# Patient Record
Sex: MALE | Race: OTHER | Employment: OTHER | URBAN - METROPOLITAN AREA
[De-identification: names, ages, dates, MRNs, and addresses within clinical notes are randomized per-mention and may not be internally consistent; named-entity substitution may affect disease eponyms.]

---

## 2017-01-19 NOTE — PATIENT DISCUSSION
(H25.043) Posterior subcapsular polar age-related cataract, bilateral - Assesment : Examination revealed Posterior Subcapsular Polar Senile Cataract. Patient is symptomatic with visual function affected. - Plan : Monitor for changes. Advised patient to call our office with decreased vision or increased symptoms.

## 2017-01-19 NOTE — PATIENT DISCUSSION
(H25.013) Cortical age-related cataract, bilateral - Assesment : Examination revealed Cortical Senile Cataract. Patient is symptomatic with visual function affected. - Plan : Risks, Benefits and Alternatives were discussed with patient at length for Cataract Surgery. Visual symptoms are consistent with Cataract findings on examination and current refraction no longer provides satisfactory vision. Advised that OS is amblyopic and visual outcome after cataract surgery may be limited and recommend OS SX first. Discussed EROF lenses but since he should wear glasses for protection may not be much benefit. Discussed that patient should wear glasses for protection due to amblyopia and may just consider a limited focus lens. Minimal astigmatism and do not recommend Toric lens. Patient does not mind wearing glasses. Patient understands and desires surgery. All questions answered. Risks, Benefits and Alternatives discussed at length for IOL placement. Doctor suggests Limited focus lens. Patient desires Limited focus lens. Patient will need to wear glasses for near and best corrected distance vision. EYE:  OS  IOL TYPE: Limted focus lens  POST OPERATIVE TARGET: [place] PACKAGE:  None OD to follow.

## 2017-01-19 NOTE — PATIENT DISCUSSION
(H53.002) Unspecified amblyopia, left eye - Assesment : Examination revealed amblyopia Os from childhood. - Plan : Monitor for changes.

## 2017-01-19 NOTE — PATIENT DISCUSSION
(H35.372) Puckering of macula, left eye - Assesment : Examination revealed ERM OS. - Plan : Monitor. Advised patient to call our office with decreased vision or increased symptoms.

## 2017-03-15 NOTE — PATIENT DISCUSSION
(Z96.1) Presence of intraocular lens - Assesment : Patient is Pseudophakic OD. - Plan : Discussed signs and symptoms of infection and retinal detachments. Do not rub operated eye. Follow drop schedule If redness,pain,decreased vision, flashes or floaters occur then contact clinic.

## 2017-03-21 NOTE — PATIENT DISCUSSION
(Z96.1) Presence of intraocular lens - Assesment : Patient is Pseudophakic. - Plan : Discussed signs and symptoms of infection and retinal detachments. Do not rub operated eye. Follow drop schedule If redness,pain,decreased vision, flashes or floaters occur then contact clinic. MD recommends +2.50 OTCs for time being. Patient is leaving April 11th for [address]. RV for 1 Month PO before patient leaves.

## 2017-04-10 NOTE — PATIENT DISCUSSION
(Z96.1) Presence of intraocular lens - Assesment : Patient is Pseudophakic OD. - Plan : Signs and symptoms of infection and retinal detachment are outlined in your surgical packet. Do not rub operated eye. Follow drop schedule. If redness, pain, decreased vision, flashes or floaters occur then contact clinic. RV 6 months follow up/MAC OCT.

## 2017-10-27 NOTE — PATIENT DISCUSSION
(Q62.477) Keratoconjunct sicca, not specified as Sjogren's, bilateral - Assesment : Examination revealed Dry Eye Syndrome OU. - Plan : Recommend artificial tears 2-3 times daily OU/PRN. Monitor for changes. Advised patient to call our office with decreased vision or increased symptoms.  RV 1 year Exam.

## 2017-10-27 NOTE — PATIENT DISCUSSION
(H35.173) Drusen (degenerative) of macula, bilateral - Assesment : Examination revealed rare Drusen OU. - Plan : Monitor for changes. Advised patient to call our office with decreased vision or increased symptoms.

## 2018-11-19 NOTE — PATIENT DISCUSSION
(Y25.358) Vitreous degeneration, bilateral - Assesment : Examination revealed a posterior vitreous detachment. - Plan : Handouts given on posterior vitreous detachment and risk factors discussed for retinal detachment development. Advised to call immediately with any changes.

## 2018-11-19 NOTE — PATIENT DISCUSSION
(Z72.308) Keratoconjunct sicca, not specified as Sjogren's, bilateral - Assesment : Examination revealed Dry Eye Syndrome OU. - Plan : Recommend artificial tears 2-3 times daily OU/PRN. Monitor for changes. Advised patient to call our office with decreased vision or increased symptoms.   RTC 1 year/EXAM

## 2018-11-19 NOTE — PATIENT DISCUSSION
(Q84.346) Other secondary cataract, left eye - Assesment : Significant posterior capsule opacification present. Patient is currently asymptomatic and functioning well. - Plan : Monitor for Changes. Advised patient to call our office with decreased vision or increased symptoms.

## 2020-11-16 ENCOUNTER — IMPORTED ENCOUNTER (OUTPATIENT)
Dept: URBAN - METROPOLITAN AREA CLINIC 50 | Facility: CLINIC | Age: 65
End: 2020-11-16

## 2021-04-18 ASSESSMENT — VISUAL ACUITY
OD_BAT: 20/25
OS_SC: 20/40
OD_CC: J1+
OS_OTHER: 20/30. 20/30.
OD_SC: 20/30
OS_PH: 20/30
OS_CC: J1+
OD_OTHER: 20/25. 20/25.
OS_BAT: 20/30

## 2021-04-18 ASSESSMENT — TONOMETRY
OD_IOP_MMHG: 14
OS_IOP_MMHG: 15

## 2021-09-23 ENCOUNTER — PREPPED CHART (OUTPATIENT)
Dept: URBAN - METROPOLITAN AREA CLINIC 53 | Facility: CLINIC | Age: 66
End: 2021-09-23

## 2021-12-23 ENCOUNTER — ESTABLISHED PATIENT (OUTPATIENT)
Dept: URBAN - METROPOLITAN AREA CLINIC 53 | Facility: CLINIC | Age: 66
End: 2021-12-23

## 2021-12-23 DIAGNOSIS — H25.13: ICD-10-CM

## 2021-12-23 DIAGNOSIS — H20.013: ICD-10-CM

## 2021-12-23 DIAGNOSIS — H04.123: ICD-10-CM

## 2021-12-23 PROCEDURE — 92134 CPTRZ OPH DX IMG PST SGM RTA: CPT

## 2021-12-23 PROCEDURE — 92014 COMPRE OPH EXAM EST PT 1/>: CPT

## 2021-12-23 ASSESSMENT — VISUAL ACUITY
OD_SC: 20/25
OU_SC: 20/20-1
OS_SC: 20/25

## 2021-12-23 ASSESSMENT — TONOMETRY
OD_IOP_MMHG: 14
OS_IOP_MMHG: 14

## 2021-12-23 NOTE — PATIENT DISCUSSION
Questionable Iritis, patient is to start Alrex TID OU until sample is gone. Lotemax has been Escribed to pharmacy to start after the Alrex sample is gone.

## 2022-01-06 ENCOUNTER — FOLLOW UP (OUTPATIENT)
Dept: URBAN - METROPOLITAN AREA CLINIC 53 | Facility: CLINIC | Age: 67
End: 2022-01-06

## 2022-01-06 DIAGNOSIS — H20.013: ICD-10-CM

## 2022-01-06 PROCEDURE — 92012 INTRM OPH EXAM EST PATIENT: CPT

## 2022-01-06 ASSESSMENT — VISUAL ACUITY
OD_SC: 20/25
OU_SC: 20/20
OS_SC: 20/25

## 2022-01-06 ASSESSMENT — TONOMETRY
OD_IOP_MMHG: 14
OS_IOP_MMHG: 14

## 2022-01-06 NOTE — PATIENT DISCUSSION
D/w patient for the next three days use drop TID. On days 4-6 use drop BID, then on day 7 use drop once a day. D/w patient if pain starts back up call office immediately.

## 2022-03-02 ENCOUNTER — COMPREHENSIVE EXAM (OUTPATIENT)
Dept: URBAN - METROPOLITAN AREA CLINIC 53 | Facility: CLINIC | Age: 67
End: 2022-03-02

## 2022-03-02 DIAGNOSIS — H20.013: ICD-10-CM

## 2022-03-02 DIAGNOSIS — H25.13: ICD-10-CM

## 2022-03-02 DIAGNOSIS — H52.4: ICD-10-CM

## 2022-03-02 DIAGNOSIS — E11.9: ICD-10-CM

## 2022-03-02 DIAGNOSIS — H16.223: ICD-10-CM

## 2022-03-02 PROCEDURE — 92015 DETERMINE REFRACTIVE STATE: CPT

## 2022-03-02 PROCEDURE — 92014 COMPRE OPH EXAM EST PT 1/>: CPT

## 2022-03-02 PROCEDURE — 92134 CPTRZ OPH DX IMG PST SGM RTA: CPT

## 2022-03-02 RX ORDER — PREDNISOLONE ACETATE 10 MG/ML: 1 SUSPENSION/ DROPS OPHTHALMIC

## 2022-03-02 ASSESSMENT — KERATOMETRY
OD_AXISANGLE_DEGREES: 077
OD_K2POWER_DIOPTERS: 43.50
OS_AXISANGLE_DEGREES: 131
OD_AXISANGLE2_DEGREES: 167
OS_K1POWER_DIOPTERS: 43.50
OS_K2POWER_DIOPTERS: 44.00
OD_K1POWER_DIOPTERS: 43.00
OS_AXISANGLE2_DEGREES: 41

## 2022-03-02 ASSESSMENT — TONOMETRY
OS_IOP_MMHG: 13
OD_IOP_MMHG: 13

## 2022-03-02 ASSESSMENT — VISUAL ACUITY
OD_PH: 20/20-2
OD_CC: J2
OS_CC: J1-1
OD_GLARE: 20/25-2
OU_CC: J1
OS_GLARE: 20/30
OS_SC: 20/25
OU_SC: 20/25
OS_GLARE: 20/40-2
OD_GLARE: 20/40+1
OD_SC: 20/30

## 2022-03-02 NOTE — PATIENT DISCUSSION
Went over drop regiment with patient today. Continue Preservative free artificial tears 2-3 times a day OU. Start warm compresses and lid scrubs OU.

## 2022-03-02 NOTE — PATIENT DISCUSSION
Denies history of autoimmune/systemic conditions. Patient states ocular symptoms have been present since COVID vaccine. If reoccurence, recommend labs to rule out underlying systemic condition.

## 2022-03-02 NOTE — PATIENT DISCUSSION
Iritis evident today OU. Informed patient to d/c Lotemax. Start Prednisolone Acetate OU tid for 2 weeks, taper instructions to be given at next appointment. RTC for follow up in 2 weeks. D/w patient if pain starts back up call office immediately.

## 2022-03-16 ENCOUNTER — FOLLOW UP (OUTPATIENT)
Dept: URBAN - METROPOLITAN AREA CLINIC 53 | Facility: CLINIC | Age: 67
End: 2022-03-16

## 2022-03-16 DIAGNOSIS — H20.013: ICD-10-CM

## 2022-03-16 PROCEDURE — 92012 INTRM OPH EXAM EST PATIENT: CPT

## 2022-03-16 ASSESSMENT — VISUAL ACUITY
OS_PH: 20/25-1
OS_SC: 20/30-1
OU_SC: 20/25
OD_PH: 20/20
OD_SC: 20/30-1

## 2022-03-16 ASSESSMENT — TONOMETRY
OS_IOP_MMHG: 15
OD_IOP_MMHG: 15

## 2022-03-16 NOTE — PATIENT DISCUSSION
Improved. Start taper, use Prednisolone Acetate OU twice a day for 2 weeks then 1 time a day for 2 weeks.  RTC for follow up in 3-4 weeks. D/w patient if pain starts back up call office immediately.

## 2022-04-11 ENCOUNTER — FOLLOW UP (OUTPATIENT)
Dept: URBAN - METROPOLITAN AREA CLINIC 48 | Facility: CLINIC | Age: 67
End: 2022-04-11

## 2022-04-11 DIAGNOSIS — H20.013: ICD-10-CM

## 2022-04-11 PROCEDURE — 92012 INTRM OPH EXAM EST PATIENT: CPT

## 2022-04-11 ASSESSMENT — VISUAL ACUITY
OS_SC: 20/30+2
OD_SC: 20/30-1
OS_PH: 20/25-1
OD_PH: 20/20

## 2022-04-11 ASSESSMENT — TONOMETRY
OS_IOP_MMHG: 14
OD_IOP_MMHG: 14

## 2022-04-11 NOTE — PATIENT DISCUSSION
Resolved. Continuet taper, Pred Acetate Qday x 1.5 weeks OU, then discontinue. D/w patient if pain/symptoms starts back up call office immediately.

## 2022-11-14 ENCOUNTER — COMPREHENSIVE EXAM (OUTPATIENT)
Dept: URBAN - METROPOLITAN AREA CLINIC 53 | Facility: CLINIC | Age: 67
End: 2022-11-14

## 2022-11-14 DIAGNOSIS — H25.813: ICD-10-CM

## 2022-11-14 DIAGNOSIS — H16.223: ICD-10-CM

## 2022-11-14 DIAGNOSIS — E11.9: ICD-10-CM

## 2022-11-14 PROCEDURE — 92014 COMPRE OPH EXAM EST PT 1/>: CPT

## 2022-11-14 ASSESSMENT — VISUAL ACUITY
OS_SC: 20/50
OU_CC: J1 @ 14"
OS_GLARE: 20/25
OS_PH: 20/25
OS_GLARE: 20/40
OD_GLARE: 20/50
OD_SC: 20/30-2
OD_GLARE: 20/30

## 2022-11-14 ASSESSMENT — TONOMETRY
OS_IOP_MMHG: 13
OD_IOP_MMHG: 12

## 2022-11-14 NOTE — PATIENT DISCUSSION
Went over drop regiment with patient today. Continue Preservative free artificial tears 2-3 times a day OU. Continue warm compresses bid OU.

## 2023-10-31 NOTE — PATIENT DISCUSSION
(H25.011) Cortical age-related cataract, right eye - Assesment : Examination revealed Cortical Senile Cataract. Patient is symptomatic with visual function affected. - Plan : Risks, Benefits and Alternatives were discussed with patient at length for Cataract Surgery. Visual symptoms are consistent with Cataract findings on examination and current refraction no longer provides satisfactory vision. Advised that OS is amblyopic and visual outcome after cataract surgery may be limited and recommend OS SX first. Discussed EROF lenses but since he should wear glasses for protection may not be much benefit. Discussed that patient should wear glasses for protection due to amblyopia and may just consider a limited focus lens. Minimal astigmatism and do not recommend Toric lenses OU. Patient does not mind wearing glasses. Patient understands and desires surgery. All questions answered. Risks, Benefits and Alternatives discussed at length for IOL placement. Doctor suggests Limited focus lens. Patient desires Limited focus lens. Patient will need to wear glasses for near and best corrected distance vision.   EYE:  OD IOL TYPE: Limted focus lens  POST OPERATIVE TARGET: [place] PACKAGE:  None Post-Care Instructions: I reviewed with the patient in detail post-care instructions. Patient is to wear sunprotection, and avoid picking at any of the treated lesions. Pt may apply Vaseline to crusted or scabbing areas. Detail Level: Detailed Render Note In Bullet Format When Appropriate: No Show Applicator Variable?: Yes Number Of Freeze-Thaw Cycles: 2 freeze-thaw cycles Consent: The patient's consent was obtained including but not limited to risks of crusting, scabbing, blistering, scarring, darker or lighter pigmentary change, recurrence, incomplete removal and infection. Duration Of Freeze Thaw-Cycle (Seconds): 0

## 2023-11-21 ENCOUNTER — COMPREHENSIVE EXAM (OUTPATIENT)
Dept: URBAN - METROPOLITAN AREA CLINIC 53 | Facility: CLINIC | Age: 68
End: 2023-11-21

## 2023-11-21 DIAGNOSIS — H16.223: ICD-10-CM

## 2023-11-21 DIAGNOSIS — E11.9: ICD-10-CM

## 2023-11-21 DIAGNOSIS — H25.813: ICD-10-CM

## 2023-11-21 PROCEDURE — 92015 DETERMINE REFRACTIVE STATE: CPT

## 2023-11-21 PROCEDURE — 92134 CPTRZ OPH DX IMG PST SGM RTA: CPT

## 2023-11-21 PROCEDURE — 92014 COMPRE OPH EXAM EST PT 1/>: CPT

## 2023-11-21 ASSESSMENT — KERATOMETRY
OD_K1POWER_DIOPTERS: 43.00
OS_K1POWER_DIOPTERS: 43.50
OS_K2POWER_DIOPTERS: 44.00
OD_K2POWER_DIOPTERS: 43.50
OD_AXISANGLE_DEGREES: 69
OS_AXISANGLE2_DEGREES: 29
OS_AXISANGLE_DEGREES: 119
OD_AXISANGLE2_DEGREES: 159

## 2023-11-21 ASSESSMENT — TONOMETRY
OD_IOP_MMHG: 13
OS_IOP_MMHG: 13

## 2023-11-21 ASSESSMENT — VISUAL ACUITY
OD_SC: 20/25
OD_GLARE: 20/25
OS_GLARE: 20/25
OS_GLARE: 20/25
OU_CC: J1+@16"
OS_SC: 20/30-1
OD_GLARE: 20/20-1
OS_PH: 20/20-1
OU_SC: 20/20-1